# Patient Record
Sex: FEMALE | Race: WHITE
[De-identification: names, ages, dates, MRNs, and addresses within clinical notes are randomized per-mention and may not be internally consistent; named-entity substitution may affect disease eponyms.]

---

## 2019-02-19 ENCOUNTER — HOSPITAL ENCOUNTER (EMERGENCY)
Dept: HOSPITAL 74 - JER | Age: 79
LOS: 1 days | Discharge: HOME | End: 2019-02-20
Payer: COMMERCIAL

## 2019-02-19 VITALS — TEMPERATURE: 97.8 F

## 2019-02-19 VITALS — BODY MASS INDEX: 25.2 KG/M2

## 2019-02-19 DIAGNOSIS — J32.9: ICD-10-CM

## 2019-02-19 DIAGNOSIS — I10: Primary | ICD-10-CM

## 2019-02-19 NOTE — PDOC
History of Present Illness





- General


Chief Complaint: Headache


Stated Complaint: BLOOD PRESSURE PROBLEM


Time Seen by Provider: 02/19/19 21:52





- History of Present Illness


Initial Comments: 





02/20/19 02:00


79-year-old female complaining of frontal headache times one day and noted to 

have blood pressure high at home. Denies chest pain, nausea, vomiting, 

dizziness. Patient is currently on antihypertensive medications for 

hyperlipidemia.





Past History





- Past Medical History


Allergies/Adverse Reactions: 


 Allergies











Allergy/AdvReac Type Severity Reaction Status Date / Time


 


lactose Allergy   Verified 02/19/19 21:58


 


Penicillins Allergy   Verified 02/19/19 21:58











Home Medications: 


Ambulatory Orders





Metoprolol Succinate [Toprol XL -] 25 mg PO DAILY 02/21/14 


Simvastatin [Zocor -] 20 mg PO HS #0 tablet 02/24/14 








COPD: No


HTN: Yes


Hypercholesterolemia: Yes





- Surgical History


Appendectomy: Yes





- Suicide/Smoking/Psychosocial Hx


Smoking History: Never smoked


Have you smoked in the past 12 months: No


Information on smoking cessation initiated: No


Hx Alcohol Use: No


Drug/Substance Use Hx: No


Substance Use Type: None


Hx Substance Use Treatment: No





**Review of Systems





- Review of Systems


Able to Perform ROS?: Yes


Is the patient limited English proficient: No


Constitutional: No: Symptoms Reported, See HPI, Chills, Diaphoresis, Fever, 

Loss of Appetite, Malaise, Night Sweats, Weakness, Weight Stable, Unintentional 

Wgt. Loss, Unexplained wgt Loss, Other


Neurological: Yes: Headache





*Physical Exam





- Vital Signs


 Last Vital Signs











Temp Pulse Resp BP Pulse Ox


 


 97.8 F   70   18   183/80 H  97 


 


 02/19/19 21:53  02/19/19 21:53  02/19/19 21:53  02/19/19 21:53  02/19/19 21:53














- Physical Exam


General Appearance: Yes: Appropriately Dressed


HEENT: positive: Sinus Tenderness (frontal)


Respiratory/Chest: positive: Lungs Clear, Normal Breath Sounds


Cardiovascular: positive: Regular Rhythm, Regular Rate


Gastrointestinal/Abdominal: positive: Normal Bowel Sounds, Tender, Soft


Extremity: positive: Normal Capillary Refill, Normal Inspection, Normal Range 

of Motion


Integumentary: positive: Normal Color, Dry, Warm


Neurologic: positive: Fully Oriented, Alert, Normal Mood/Affect





Moderate Sedation





- Procedure Monitoring


Vital Signs: 


Procedure Monitoring Vital Signs











Temperature  97.8 F   02/19/19 21:53


 


Pulse Rate  70   02/19/19 21:53


 


Respiratory Rate  18   02/19/19 21:53


 


Blood Pressure  183/80 H  02/19/19 21:53


 


O2 Sat by Pulse Oximetry (%)  97   02/19/19 21:53











ED Treatment Course





- LABORATORY


CBC & Chemistry Diagram: 


 02/20/19 00:05





 02/20/19 00:05





Progress Note





- Progress Note


Progress Note: 





Headache; hypertension





p: CT head and CT sinus no acute finding noted to have chronic maxillary sinus 

minimal congestion





CT head s no acute finding





Labs within normal





*DC/Admit/Observation/Transfer


Diagnosis at time of Disposition: 


 Sinus headache





Hypertension


Qualifiers:


 Hypertension type: unspecified Qualified Code(s): I10 - Essential (primary) 

hypertension








- Discharge Dispostion


Disposition: HOME





- Referrals


Referrals: 


Mario Álvarez MD [Primary Care Provider] - Call tomorrow





- Patient Instructions


Printed Discharge Instructions:  DI for Migraine


Additional Instructions: 


You may take Tylenol every 4-6 hours as needed for headache.








Please follow-up with Dr. Álvarez to manage your blood pressure. Keep daily 

journal of your blood pressure.





Return immediately to the emergency room if symptoms worsen.








- Post Discharge Activity

## 2019-02-19 NOTE — PDOC
Rapid Medical Evaluation


Chief Complaint: Headache


Time Seen by Provider: 02/19/19 21:52


Medical Evaluation: 


 Allergies











Allergy/AdvReac Type Severity Reaction Status Date / Time


 


lactose Allergy   Verified 02/21/14 12:44


 


Penicillins Allergy   Verified 02/21/14 12:44











02/19/19 21:53


I have performed a brief in person evaluation of this patient.





CC: MCDONOUGH





HPI: Pt is a 78 YO female who complains of HA and HTN.  Pt's daughter took her 

BP at home it was 206 systolic.  Pt take meds at home.





PE: 


Skin: Clear


Lungs: Clear


Heart:RRR


MS: Moves all extremities without difficulty


Neuro: Alert and oriented


Psych: Appropriate affect








Pt will proceed to the main ED for further evaluation.





**Discharge Disposition





- Diagnosis


Hypertension


Qualifiers:


 Hypertension type: unspecified Qualified Code(s): I10 - Essential (primary) 

hypertension








- Referrals


Referrals: 


Mario Álvarez MD [Primary Care Provider] - 





- Patient Instructions





- Post Discharge Activity

## 2019-02-19 NOTE — PDOC
*Physical Exam





- Vital Signs


 Last Vital Signs











Temp Pulse Resp BP Pulse Ox


 


 97.8 F   70   18   183/80 H  97 


 


 02/19/19 21:53  02/19/19 21:53  02/19/19 21:53  02/19/19 21:53  02/19/19 21:53














ED Treatment Course





- LABORATORY


CBC & Chemistry Diagram: 


 02/20/19 00:05





 02/20/19 00:05





Medical Decision Making





- Medical Decision Making





02/19/19 22:51


Patient seen by the advanced practice provider under my direct supervision. 

Ancillary testing reviewed as necessary.


I agree with plan as outlined by the advanced practice provider.





*DC/Admit/Observation/Transfer


Diagnosis at time of Disposition: 


 Sinus headache





Hypertension


Qualifiers:


 Hypertension type: unspecified Qualified Code(s): I10 - Essential (primary) 

hypertension








- Discharge Dispostion


Disposition: HOME





- Referrals


Referrals: 


Mario Álvarez MD [Primary Care Provider] - Call tomorrow





- Patient Instructions


Printed Discharge Instructions:  DI for Migraine


Additional Instructions: 


You may take Tylenol every 4-6 hours as needed for headache.








Please follow-up with Dr. Álvarez to manage your blood pressure. Keep daily 

journal of your blood pressure.





Return immediately to the emergency room if symptoms worsen.








- Post Discharge Activity

## 2019-02-20 VITALS — DIASTOLIC BLOOD PRESSURE: 76 MMHG | SYSTOLIC BLOOD PRESSURE: 177 MMHG | HEART RATE: 88 BPM

## 2019-02-20 LAB
ALBUMIN SERPL-MCNC: 3.7 G/DL (ref 3.4–5)
ALP SERPL-CCNC: 80 U/L (ref 45–117)
ALT SERPL-CCNC: 35 U/L (ref 13–61)
ANION GAP SERPL CALC-SCNC: 5 MMOL/L (ref 8–16)
APPEARANCE UR: CLEAR
AST SERPL-CCNC: 27 U/L (ref 15–37)
BACTERIA #/AREA URNS HPF: (no result) /HPF
BASOPHILS # BLD: 0.6 % (ref 0–2)
BILIRUB SERPL-MCNC: 0.2 MG/DL (ref 0.2–1)
BILIRUB UR STRIP.AUTO-MCNC: NEGATIVE MG/DL
BUN SERPL-MCNC: 18 MG/DL (ref 7–18)
CALCIUM SERPL-MCNC: 8.5 MG/DL (ref 8.5–10.1)
CHLORIDE SERPL-SCNC: 102 MMOL/L (ref 98–107)
CO2 SERPL-SCNC: 30 MMOL/L (ref 21–32)
COLOR UR: COLORLESS
CREAT SERPL-MCNC: 0.8 MG/DL (ref 0.55–1.3)
DEPRECATED RDW RBC AUTO: 12.8 % (ref 11.6–15.6)
EOSINOPHIL # BLD: 1.7 % (ref 0–4.5)
EPITH CASTS URNS QL MICRO: (no result) /HPF
GLUCOSE SERPL-MCNC: 107 MG/DL (ref 74–106)
HCT VFR BLD CALC: 43.1 % (ref 32.4–45.2)
HGB BLD-MCNC: 14.8 GM/DL (ref 10.7–15.3)
INR BLD: 0.98 (ref 0.83–1.09)
KETONES UR QL STRIP: NEGATIVE
LEUKOCYTE ESTERASE UR QL STRIP.AUTO: NEGATIVE
LYMPHOCYTES # BLD: 27 % (ref 8–40)
MAGNESIUM SERPL-MCNC: 2.3 MG/DL (ref 1.8–2.4)
MCH RBC QN AUTO: 31.8 PG (ref 25.7–33.7)
MCHC RBC AUTO-ENTMCNC: 34.5 G/DL (ref 32–36)
MCV RBC: 92.1 FL (ref 80–96)
MONOCYTES # BLD AUTO: 7.9 % (ref 3.8–10.2)
NEUTROPHILS # BLD: 62.8 % (ref 42.8–82.8)
NITRITE UR QL STRIP: NEGATIVE
PH UR: 6 [PH] (ref 5–8)
PLATELET # BLD AUTO: 233 K/MM3 (ref 134–434)
PMV BLD: 8.8 FL (ref 7.5–11.1)
POTASSIUM SERPLBLD-SCNC: 4.2 MMOL/L (ref 3.5–5.1)
PROT SERPL-MCNC: 7.5 G/DL (ref 6.4–8.2)
PROT UR QL STRIP: NEGATIVE
PROT UR QL STRIP: NEGATIVE
PT PNL PPP: 11.6 SEC (ref 9.7–13)
RBC # BLD AUTO: 4.67 M/MM3 (ref 3.6–5.2)
SODIUM SERPL-SCNC: 137 MMOL/L (ref 136–145)
SP GR UR: 1 (ref 1.01–1.03)
UROBILINOGEN UR STRIP-MCNC: NEGATIVE MG/DL (ref 0.2–1)
WBC # BLD AUTO: 8.8 K/MM3 (ref 4–10)

## 2019-02-21 NOTE — EKG
Test Reason : 

Blood Pressure : ***/*** mmHG

Vent. Rate : 067 BPM     Atrial Rate : 067 BPM

   P-R Int : 164 ms          QRS Dur : 090 ms

    QT Int : 432 ms       P-R-T Axes : 046 015 027 degrees

   QTc Int : 456 ms

 

*** POOR DATA QUALITY, INTERPRETATION MAY BE ADVERSELY AFFECTED

NORMAL SINUS RHYTHM

NORMAL ECG

WHEN COMPARED WITH ECG OF 21-FEB-2014 14:03,

NO SIGNIFICANT CHANGE WAS FOUND

Confirmed by XOCHITL DUARTE, DIRK ([id number]) on 2/21/2019 2:37:11 AM

 

Referred By:             Confirmed By:DIRK LEUNG MD

## 2022-04-09 ENCOUNTER — HOSPITAL ENCOUNTER (EMERGENCY)
Dept: HOSPITAL 74 - FER | Age: 82
Discharge: HOME | End: 2022-04-09
Payer: COMMERCIAL

## 2022-04-09 VITALS — HEART RATE: 66 BPM | SYSTOLIC BLOOD PRESSURE: 141 MMHG | TEMPERATURE: 99.4 F | DIASTOLIC BLOOD PRESSURE: 63 MMHG

## 2022-04-09 VITALS — BODY MASS INDEX: 30.9 KG/M2

## 2022-04-09 DIAGNOSIS — U07.1: Primary | ICD-10-CM

## 2022-04-09 PROCEDURE — U0005 INFEC AGEN DETEC AMPLI PROBE: HCPCS

## 2022-04-09 PROCEDURE — U0003 INFECTIOUS AGENT DETECTION BY NUCLEIC ACID (DNA OR RNA); SEVERE ACUTE RESPIRATORY SYNDROME CORONAVIRUS 2 (SARS-COV-2) (CORONAVIRUS DISEASE [COVID-19]), AMPLIFIED PROBE TECHNIQUE, MAKING USE OF HIGH THROUGHPUT TECHNOLOGIES AS DESCRIBED BY CMS-2020-01-R: HCPCS

## 2022-04-12 ENCOUNTER — HOSPITAL ENCOUNTER (EMERGENCY)
Dept: HOSPITAL 74 - JER | Age: 82
Discharge: HOME | End: 2022-04-12
Payer: COMMERCIAL

## 2022-04-12 VITALS — DIASTOLIC BLOOD PRESSURE: 63 MMHG | SYSTOLIC BLOOD PRESSURE: 165 MMHG | TEMPERATURE: 98.1 F | HEART RATE: 61 BPM

## 2022-04-12 VITALS — BODY MASS INDEX: 29.9 KG/M2

## 2022-04-12 DIAGNOSIS — U07.1: Primary | ICD-10-CM

## 2022-04-12 PROCEDURE — 3E033GC INTRODUCTION OF OTHER THERAPEUTIC SUBSTANCE INTO PERIPHERAL VEIN, PERCUTANEOUS APPROACH: ICD-10-PCS

## 2022-11-09 ENCOUNTER — OFFICE (OUTPATIENT)
Dept: URBAN - METROPOLITAN AREA CLINIC 121 | Facility: CLINIC | Age: 82
Setting detail: OPHTHALMOLOGY
End: 2022-11-09
Payer: COMMERCIAL

## 2022-11-09 DIAGNOSIS — H25.13: ICD-10-CM

## 2022-11-09 DIAGNOSIS — H40.013: ICD-10-CM

## 2022-11-09 DIAGNOSIS — H35.3131: ICD-10-CM

## 2022-11-09 PROBLEM — H16.223 DRY EYE SYNDROME K SICCA; BOTH EYES: Status: ACTIVE | Noted: 2022-11-09

## 2022-11-09 PROBLEM — H35.033 HYPERTENSIVE RETINOPATHY; BOTH EYES: Status: ACTIVE | Noted: 2022-11-09

## 2022-11-09 PROBLEM — E11.9 DIABETES TYPE 2 NO RETINOPATHY ; BOTH EYES: Status: ACTIVE | Noted: 2022-11-09

## 2022-11-09 PROCEDURE — 76514 ECHO EXAM OF EYE THICKNESS: CPT | Performed by: OPHTHALMOLOGY

## 2022-11-09 PROCEDURE — 92004 COMPRE OPH EXAM NEW PT 1/>: CPT | Performed by: OPHTHALMOLOGY

## 2022-11-09 PROCEDURE — 92250 FUNDUS PHOTOGRAPHY W/I&R: CPT | Performed by: OPHTHALMOLOGY

## 2022-11-09 ASSESSMENT — SUPERFICIAL PUNCTATE KERATITIS (SPK)
OD_SPK: 2+
OS_SPK: 2+

## 2022-11-09 ASSESSMENT — CONFRONTATIONAL VISUAL FIELD TEST (CVF)
OS_FINDINGS: FULL
OD_FINDINGS: FULL

## 2022-11-09 ASSESSMENT — PACHYMETRY
OD_CT_UM: 537
OS_CT_UM: 552
OD_CT_CORRECTION: 1
OS_CT_CORRECTION: -1

## 2022-11-09 ASSESSMENT — TONOMETRY
OD_IOP_MMHG: 14
OS_IOP_MMHG: 14

## 2022-11-11 ENCOUNTER — RX ONLY (RX ONLY)
Age: 82
End: 2022-11-11

## 2022-11-11 ASSESSMENT — REFRACTION_CURRENTRX
OD_AXIS: 180
OS_OVR_VA: 20/
OD_OVR_VA: 20/
OD_CYLINDER: 0.00
OS_AXIS: 012
OD_SPHERE: +2.50
OS_CYLINDER: +0.25
OS_SPHERE: +2.25

## 2022-11-11 ASSESSMENT — KERATOMETRY
OD_K2POWER_DIOPTERS: 46.00
OS_AXISANGLE_DEGREES: 072
OS_K1POWER_DIOPTERS: 45.00
OS_K2POWER_DIOPTERS: 46.25
OD_K1POWER_DIOPTERS: 45.00
OD_AXISANGLE_DEGREES: 083

## 2022-11-11 ASSESSMENT — VISUAL ACUITY
OS_BCVA: 20/80
OD_BCVA: 20/50

## 2022-11-11 ASSESSMENT — SPHEQUIV_DERIVED
OS_SPHEQUIV: 0.25
OD_SPHEQUIV: -0.625

## 2022-11-11 ASSESSMENT — REFRACTION_AUTOREFRACTION
OD_CYLINDER: +1.25
OD_AXIS: 104
OS_SPHERE: -0.25
OS_AXIS: 002
OS_CYLINDER: +1.00
OD_SPHERE: -1.25

## 2022-11-11 ASSESSMENT — AXIALLENGTH_DERIVED
OS_AL: 22.7455
OD_AL: 23.1108

## 2023-01-13 ENCOUNTER — OFFICE (OUTPATIENT)
Dept: URBAN - METROPOLITAN AREA CLINIC 121 | Facility: CLINIC | Age: 83
Setting detail: OPHTHALMOLOGY
End: 2023-01-13
Payer: COMMERCIAL

## 2023-01-13 DIAGNOSIS — H35.3131: ICD-10-CM

## 2023-01-13 DIAGNOSIS — H25.12: ICD-10-CM

## 2023-01-13 DIAGNOSIS — H25.13: ICD-10-CM

## 2023-01-13 PROBLEM — H57.03: Status: ACTIVE | Noted: 2023-01-13

## 2023-01-13 PROCEDURE — 99213 OFFICE O/P EST LOW 20 MIN: CPT | Performed by: OPHTHALMOLOGY

## 2023-01-13 PROCEDURE — 92136 OPHTHALMIC BIOMETRY: CPT | Performed by: OPHTHALMOLOGY

## 2023-01-13 PROCEDURE — 92134 CPTRZ OPH DX IMG PST SGM RTA: CPT | Performed by: OPHTHALMOLOGY

## 2023-01-13 ASSESSMENT — AXIALLENGTH_DERIVED
OD_AL: 22.8821
OS_AL: 22.0411
OS_AL: 23.0642
OD_AL: 21.9588

## 2023-01-13 ASSESSMENT — KERATOMETRY
OS_K1POWER_DIOPTERS: 45.25
OD_K1POWER_DIOPTERS: 45.25
OS_K1K2_AVERAGE: 45.5
OS_CYLPOWER_DEGREES: 0.5
OS_K1POWER_DIOPTERS: 45.25
OD_AXISANGLE2_DEGREES: 076
OS_K2POWER_DIOPTERS: 45.75
OS_CYLAXISANGLE_DEGREES: 051
OS_AXISANGLE2_DEGREES: 051
OD_K1K2_AVERAGE: 45.625
OD_AXISANGLE_DEGREES: 166
OD_AXISANGLE_DEGREES: 076
OD_K2POWER_DIOPTERS: 46.00
OD_CYLPOWER_DEGREES: 0.75
OS_K2POWER_DIOPTERS: 45.75
OS_AXISANGLE_DEGREES: 141
OD_K2POWER_DIOPTERS: 46.00
OS_AXISANGLE_DEGREES: 051
OD_K1POWER_DIOPTERS: 45.25
OD_CYLAXISANGLE_DEGREES: 076

## 2023-01-13 ASSESSMENT — REFRACTION_CURRENTRX
OS_OVR_VA: 20/
OD_CYLINDER: 0.00
OS_SPHERE: +2.25
OS_AXIS: 012
OD_OVR_VA: 20/
OD_SPHERE: +2.50
OD_AXIS: 180
OS_CYLINDER: +0.25

## 2023-01-13 ASSESSMENT — SPHEQUIV_DERIVED
OS_SPHEQUIV: 2.375
OD_SPHEQUIV: 2.5
OD_SPHEQUIV: -0.125
OS_SPHEQUIV: -0.5

## 2023-01-13 ASSESSMENT — REFRACTION_AUTOREFRACTION
OS_SPHERE: -1.00
OS_CYLINDER: +1.00
OD_SPHERE: -0.50
OS_AXIS: 175
OD_AXIS: 004
OD_CYLINDER: +0.75

## 2023-01-13 ASSESSMENT — PACHYMETRY
OS_CT_UM: 552
OD_CT_CORRECTION: 1
OS_CT_CORRECTION: -1
OD_CT_UM: 537

## 2023-01-13 ASSESSMENT — TONOMETRY
OS_IOP_MMHG: 15
OD_IOP_MMHG: 15

## 2023-01-13 ASSESSMENT — CONFRONTATIONAL VISUAL FIELD TEST (CVF)
OS_FINDINGS: FULL
OD_FINDINGS: FULL

## 2023-01-13 ASSESSMENT — SUPERFICIAL PUNCTATE KERATITIS (SPK)
OD_SPK: 2+
OS_SPK: 2+

## 2023-01-13 ASSESSMENT — REFRACTION_MANIFEST
OD_AXIS: 180
OS_AXIS: 012
OS_CYLINDER: +0.25
OD_CYLINDER: 0.00
OD_SPHERE: +2.50
OS_SPHERE: +2.25

## 2023-01-13 ASSESSMENT — VISUAL ACUITY
OS_BCVA: 20/80
OD_BCVA: 20/80

## 2023-04-03 PROBLEM — Z00.00 ENCOUNTER FOR PREVENTIVE HEALTH EXAMINATION: Status: ACTIVE | Noted: 2023-04-03

## 2023-04-04 ENCOUNTER — APPOINTMENT (OUTPATIENT)
Dept: PEDIATRIC ORTHOPEDIC SURGERY | Facility: CLINIC | Age: 83
End: 2023-04-04
Payer: MEDICARE

## 2023-04-04 VITALS — WEIGHT: 178 LBS | BODY MASS INDEX: 30.39 KG/M2 | HEIGHT: 64 IN

## 2023-04-04 VITALS — SYSTOLIC BLOOD PRESSURE: 139 MMHG | TEMPERATURE: 97 F | DIASTOLIC BLOOD PRESSURE: 74 MMHG

## 2023-04-04 DIAGNOSIS — M19.011 PRIMARY OSTEOARTHRITIS, RIGHT SHOULDER: ICD-10-CM

## 2023-04-04 PROCEDURE — 99202 OFFICE O/P NEW SF 15 MIN: CPT

## 2023-04-04 PROCEDURE — 73030 X-RAY EXAM OF SHOULDER: CPT | Mod: RT

## 2023-04-04 NOTE — ASSESSMENT
[FreeTextEntry1] : DJD right shoulder\par \par This patient will be treated with meloxicam as well as physical therapy.  She may return for cortisone injection.

## 2023-04-04 NOTE — PHYSICAL EXAM
[de-identified] : On physical examination there is a full range of motion of the cervical spine.  Examination of the left shoulder reveals marked tenderness in the anterior aspect of the shoulder.  She has limitation of all planes of motion especially abduction forward flexion and internal rotation.  Motor or sensory and deep tendon reflex examination of the right upper extremity is within normal limits.  This patient is right-handed. [de-identified] : X-ray evaluation of the right shoulder on 4/4/2023 (AP and lateral views) reveals degenerative changes in the acromioclavicular joint and to a lesser degree in the glenohumeral joint.

## 2023-04-04 NOTE — HISTORY OF PRESENT ILLNESS
[de-identified] : This 83-year-old female is here for evaluation to month history of right shoulder pain which has recently become worse.  She states that she has limited motion of the shoulder before she experiences pain.  There has been no history of injury.  There is no neurological symptomatology.

## 2023-05-19 ENCOUNTER — APPOINTMENT (OUTPATIENT)
Dept: VASCULAR SURGERY | Facility: CLINIC | Age: 83
End: 2023-05-19
Payer: MEDICARE

## 2023-05-19 VITALS
OXYGEN SATURATION: 96 % | HEART RATE: 64 BPM | WEIGHT: 188 LBS | HEIGHT: 64 IN | DIASTOLIC BLOOD PRESSURE: 70 MMHG | BODY MASS INDEX: 32.1 KG/M2 | SYSTOLIC BLOOD PRESSURE: 130 MMHG | RESPIRATION RATE: 16 BRPM

## 2023-05-19 DIAGNOSIS — Z78.9 OTHER SPECIFIED HEALTH STATUS: ICD-10-CM

## 2023-05-19 DIAGNOSIS — J30.2 OTHER SEASONAL ALLERGIC RHINITIS: ICD-10-CM

## 2023-05-19 PROCEDURE — 99204 OFFICE O/P NEW MOD 45 MIN: CPT

## 2023-05-20 PROBLEM — J30.2 SEASONAL ALLERGIES: Status: ACTIVE | Noted: 2023-05-20

## 2023-05-20 PROBLEM — Z78.9 NON-SMOKER: Status: ACTIVE | Noted: 2023-05-20

## 2023-05-20 RX ORDER — ACETAMINOPHEN 500 MG/1
500 TABLET, COATED ORAL
Refills: 0 | Status: ACTIVE | COMMUNITY

## 2023-05-20 RX ORDER — FLUTICASONE PROPIONATE 50 UG/1
50 SPRAY, METERED NASAL
Refills: 0 | Status: ACTIVE | COMMUNITY

## 2023-05-20 RX ORDER — OLMESARTAN MEDOXOMIL 40 MG/1
40 TABLET, FILM COATED ORAL DAILY
Refills: 0 | Status: ACTIVE | COMMUNITY

## 2023-05-20 RX ORDER — METOPROLOL SUCCINATE 50 MG/1
50 TABLET, EXTENDED RELEASE ORAL DAILY
Refills: 0 | Status: ACTIVE | COMMUNITY

## 2023-05-20 RX ORDER — DONEPEZIL HYDROCHLORIDE 10 MG/1
10 TABLET ORAL AT BEDTIME
Refills: 0 | Status: ACTIVE | COMMUNITY

## 2023-05-20 RX ORDER — CALCIUM CITRATE/VITAMIN D3 250MG-5MCG
TABLET ORAL
Refills: 0 | Status: ACTIVE | COMMUNITY

## 2023-05-20 RX ORDER — NIACIN 500 MG
1000 TABLET, EXTENDED RELEASE ORAL DAILY
Refills: 0 | Status: ACTIVE | COMMUNITY

## 2023-05-20 RX ORDER — LACTOBACILLUS RHAMNOSUS GG 10B CELL
CAPSULE ORAL DAILY
Refills: 0 | Status: ACTIVE | COMMUNITY

## 2023-05-20 NOTE — HISTORY OF PRESENT ILLNESS
[FreeTextEntry1] : 84 yo female presents to the office with a chief complaint of bluish discoloration of the feet. She also reports mild edema of bilateral lower extremities. She has a history of a venous procedure on the left. She denies pain in her legs with ambulation. She does not currently wear compression stockings. She denies a history of DVT or SVT.

## 2023-05-20 NOTE — ASSESSMENT
[FreeTextEntry1] : 83 year female with bilateral lower extremity edema and bluish discoloration of her feet. She has a history of a venous procedure on the left.  I am uncertain if she has venous insufficiency. I recommended that she undergo a venous duplex. She was instructed to begin to wear compression stockings on a daily basis.  She will elevate her legs as much as possible. She will follow up when the results of the duplex are available.  Further treatment will depend on the results of the duplex and the response to conservative therapy

## 2023-05-20 NOTE — REVIEW OF SYSTEMS
[Fever] : no fever [Chills] : no chills [Leg Claudication] : no intermittent leg claudication [Lower Ext Edema] : lower extremity edema [Limb Pain] : no limb pain [Limb Swelling] : no limb swelling

## 2023-05-20 NOTE — PHYSICAL EXAM
[JVD] : no jugular venous distention  [Normal Breath Sounds] : Normal breath sounds [Normal Rate and Rhythm] : normal rate and rhythm [2+] : left 2+ [Ankle Swelling (On Exam)] : present [Ankle Swelling Bilaterally] : bilaterally  [Ankle Swelling On The Right] : mild [Varicose Veins Of Lower Extremities] : bilaterally [Ankle Swelling On The Left] : moderate [Alert] : alert [] : bilaterally [Oriented to Person] : oriented to person [Oriented to Place] : oriented to place [Oriented to Time] : oriented to time [de-identified] : Awake and Alert [de-identified] : varicose veins bilateral calves > 3mm, spider veins bl [de-identified] : Appropriate

## 2023-08-28 ENCOUNTER — HOSPITAL ENCOUNTER (EMERGENCY)
Dept: HOSPITAL 74 - FER | Age: 83
Discharge: HOME | End: 2023-08-28
Payer: COMMERCIAL

## 2023-08-28 VITALS — BODY MASS INDEX: 30.9 KG/M2

## 2023-08-28 VITALS — HEART RATE: 82 BPM | TEMPERATURE: 98.4 F | SYSTOLIC BLOOD PRESSURE: 147 MMHG | DIASTOLIC BLOOD PRESSURE: 89 MMHG

## 2023-08-28 VITALS — RESPIRATION RATE: 18 BRPM

## 2023-08-28 DIAGNOSIS — R05.9: Primary | ICD-10-CM

## 2023-08-28 DIAGNOSIS — Z20.822: ICD-10-CM

## 2023-08-28 LAB
ALBUMIN SERPL-MCNC: 4.1 G/DL (ref 3.4–5)
ALP SERPL-CCNC: 64 U/L (ref 45–117)
ALT SERPL-CCNC: 13.1 U/L (ref 7–52)
ANION GAP SERPL CALC-SCNC: 12 MMOL/L (ref 8–16)
AST SERPL-CCNC: 17.8 U/L (ref 15–37)
BASE EXCESS BLDV CALC-SCNC: -1.8 MMOL/L (ref -2–2)
BUN SERPL-MCNC: 20 MG/DL (ref 7–18)
CALCIUM SERPL-MCNC: 8.9 MG/DL (ref 8.5–10.1)
CHLORIDE SERPL-SCNC: 98 MMOL/L (ref 98–107)
CO2 SERPL-SCNC: 24 MMOL/L (ref 21–32)
CREAT SERPL-MCNC: 0.9 MG/DL (ref 0.6–1.3)
DEPRECATED RDW RBC AUTO: 13.5 % (ref 11.6–15.6)
GLUCOSE SERPL-MCNC: 104 MG/DL (ref 74–106)
HCT VFR BLD CALC: 40.4 % (ref 32.4–45.2)
HCT VFR BLDV CALC: 43 % (ref 32.4–45.2)
HGB BLD-MCNC: 13.9 G/DL (ref 10.7–15.3)
MCH RBC QN AUTO: 31.8 PG (ref 25.7–33.7)
MCHC RBC AUTO-ENTMCNC: 34.3 G/DL (ref 32–36)
MCV RBC: 92.9 FL (ref 80–96)
PCO2 BLDV: 45.2 MMHG (ref 38–52)
PH BLDV: 7.34 [PH] (ref 7.31–7.41)
PLATELET # BLD AUTO: 253.8 10^3/UL (ref 134–434)
PLATELET BLD QL SMEAR: ADEQUATE
PMV BLD: 8 FL (ref 7.5–11.1)
POTASSIUM SERPLBLD-SCNC: 4.2 MMOL/L (ref 3.5–5.1)
PROT SERPL-MCNC: 6.7 G/DL (ref 6.4–8.2)
RBC # BLD AUTO: 4.35 10^6/UL (ref 3.6–5.2)
SAO2 % BLDV: 38.6 % (ref 70–80)
SODIUM SERPL-SCNC: 134 MMOL/L (ref 136–145)
WBC # BLD AUTO: 13.3 10^3/UL (ref 4–10.8)

## 2023-08-28 PROCEDURE — 3E0F7GC INTRODUCTION OF OTHER THERAPEUTIC SUBSTANCE INTO RESPIRATORY TRACT, VIA NATURAL OR ARTIFICIAL OPENING: ICD-10-PCS

## 2023-08-29 LAB — BILIRUB SERPL-MCNC: 0.4 MG/DL (ref 0.2–1)

## 2023-09-01 ENCOUNTER — APPOINTMENT (OUTPATIENT)
Dept: VASCULAR SURGERY | Facility: CLINIC | Age: 83
End: 2023-09-01

## 2023-10-27 ENCOUNTER — APPOINTMENT (OUTPATIENT)
Dept: HEART AND VASCULAR | Facility: CLINIC | Age: 83
End: 2023-10-27
Payer: MEDICARE

## 2023-10-27 PROCEDURE — 93970 EXTREMITY STUDY: CPT

## 2023-11-17 ENCOUNTER — TRANSCRIPTION ENCOUNTER (OUTPATIENT)
Age: 83
End: 2023-11-17

## 2023-11-17 ENCOUNTER — APPOINTMENT (OUTPATIENT)
Dept: VASCULAR SURGERY | Facility: CLINIC | Age: 83
End: 2023-11-17
Payer: MEDICARE

## 2023-11-17 VITALS
SYSTOLIC BLOOD PRESSURE: 119 MMHG | RESPIRATION RATE: 16 BRPM | HEART RATE: 60 BPM | BODY MASS INDEX: 32.1 KG/M2 | WEIGHT: 188 LBS | OXYGEN SATURATION: 97 % | DIASTOLIC BLOOD PRESSURE: 58 MMHG | HEIGHT: 64 IN

## 2023-11-17 PROCEDURE — 99213 OFFICE O/P EST LOW 20 MIN: CPT

## 2023-11-17 RX ORDER — UBIDECARENONE 400 MG
400 CAPSULE ORAL DAILY
Refills: 0 | Status: ACTIVE | COMMUNITY

## 2023-11-17 RX ORDER — ASCORBIC ACID 125 MG
500 TABLET,CHEWABLE ORAL DAILY
Refills: 0 | Status: ACTIVE | COMMUNITY

## 2023-11-17 RX ORDER — LIDOCAINE HCL 4 %
CREAM (GRAM) TOPICAL
Refills: 0 | Status: ACTIVE | COMMUNITY

## 2023-11-17 RX ORDER — LORATADINE 5 MG/5 ML
SOLUTION, ORAL ORAL DAILY
Refills: 0 | Status: ACTIVE | COMMUNITY

## 2023-11-17 RX ORDER — RIBOFLAVIN (VITAMIN B2) 400 MG
400 TABLET ORAL DAILY
Refills: 0 | Status: ACTIVE | COMMUNITY

## 2023-11-17 RX ORDER — AMLODIPINE BESYLATE 2.5 MG/1
2.5 TABLET ORAL
Refills: 0 | Status: ACTIVE | COMMUNITY

## 2023-11-17 RX ORDER — CETIRIZINE HYDROCHLORIDE 10 MG/1
10 TABLET, COATED ORAL
Refills: 0 | Status: DISCONTINUED | COMMUNITY
End: 2023-11-17

## 2024-01-10 ENCOUNTER — APPOINTMENT (OUTPATIENT)
Dept: PEDIATRIC ORTHOPEDIC SURGERY | Facility: CLINIC | Age: 84
End: 2024-01-10
Payer: MEDICARE

## 2024-01-10 VITALS
WEIGHT: 180 LBS | SYSTOLIC BLOOD PRESSURE: 102 MMHG | HEIGHT: 64 IN | TEMPERATURE: 96.9 F | DIASTOLIC BLOOD PRESSURE: 70 MMHG | BODY MASS INDEX: 30.73 KG/M2

## 2024-01-10 PROCEDURE — 73030 X-RAY EXAM OF SHOULDER: CPT | Mod: LT

## 2024-01-10 PROCEDURE — 20610 DRAIN/INJ JOINT/BURSA W/O US: CPT | Mod: LT

## 2024-01-10 PROCEDURE — 99213 OFFICE O/P EST LOW 20 MIN: CPT | Mod: 25

## 2024-01-10 NOTE — ASSESSMENT
[FreeTextEntry1] : Impression: Strain left shoulder.  I have injected the subacromial space uneventfully with methylprednisolone and lidocaine.  She will use Tylenol for pain and will return on a as needed basis

## 2024-01-10 NOTE — HISTORY OF PRESENT ILLNESS
[de-identified] : This 83-year-old is seen today with significant pain and restricted motion to her left shoulder.  No obvious history of trauma precipitating event this has been progressive over time over the past 6 weeks or so.  General health has been stable.

## 2024-01-10 NOTE — PROCEDURE
[FreeTextEntry1] : Under sterile technique with a Betadine prep the subacromial space of the left shoulder was injected with 40 mg of methylprednisolone and 5 cc of 1% lidocaine with a Band-Aid dressing applied at the conclusion this was tolerated without incident

## 2024-01-10 NOTE — PHYSICAL EXAM
[de-identified] : Exam today good motion to the cervical spine without spasm or tenderness the left shoulder has moderate amount of restricted forward flexion abduction in scapular plane as well as restricted rotation both in/out tenderness over the greater tuberosity and subacromial space no obvious crepitus on motion noted no instability on stress neurovascular status is intact.  X-rays ordered and taken today of the left shoulder reveal no significant arthritic changes mild degenerative changes pr

## 2024-02-05 ENCOUNTER — NON-APPOINTMENT (OUTPATIENT)
Age: 84
End: 2024-02-05

## 2024-02-06 ENCOUNTER — APPOINTMENT (OUTPATIENT)
Dept: GERIATRICS | Facility: CLINIC | Age: 84
End: 2024-02-06
Payer: MEDICARE

## 2024-02-06 VITALS
DIASTOLIC BLOOD PRESSURE: 62 MMHG | BODY MASS INDEX: 32.79 KG/M2 | TEMPERATURE: 97.6 F | OXYGEN SATURATION: 99 % | SYSTOLIC BLOOD PRESSURE: 124 MMHG | HEART RATE: 61 BPM | WEIGHT: 191 LBS

## 2024-02-06 DIAGNOSIS — I87.2 VENOUS INSUFFICIENCY (CHRONIC) (PERIPHERAL): ICD-10-CM

## 2024-02-06 DIAGNOSIS — J45.909 UNSPECIFIED ASTHMA, UNCOMPLICATED: ICD-10-CM

## 2024-02-06 DIAGNOSIS — Z86.79 PERSONAL HISTORY OF OTHER DISEASES OF THE CIRCULATORY SYSTEM: ICD-10-CM

## 2024-02-06 PROCEDURE — 99204 OFFICE O/P NEW MOD 45 MIN: CPT

## 2024-02-06 RX ORDER — METFORMIN HYDROCHLORIDE 500 MG/1
500 TABLET, FILM COATED, EXTENDED RELEASE ORAL DAILY
Refills: 0 | Status: ACTIVE | COMMUNITY
Start: 2024-02-06

## 2024-02-06 RX ORDER — MELOXICAM 7.5 MG/1
7.5 TABLET ORAL
Qty: 30 | Refills: 2 | Status: DISCONTINUED | COMMUNITY
Start: 2023-04-04 | End: 2024-02-06

## 2024-02-06 RX ORDER — ALBUTEROL SULFATE 90 UG/1
108 (90 BASE) INHALANT RESPIRATORY (INHALATION)
Refills: 0 | Status: ACTIVE | COMMUNITY
Start: 2024-02-06

## 2024-02-06 NOTE — HISTORY OF PRESENT ILLNESS
[FreeTextEntry1] : 83 year old saw Dr Mchugh - who is retiring [place]   7 years  lived in [place] prior  recent diagnosis of of Alzheimers moved in with daughter 1 1/2 years ago  cardiologist - hypertension had est normal EF, no ischemia 4/5/23  endocrine - DM  has optho has cataracts  neuro- f/up headaches and dizziness relief with mg and vitamins B2 and coenzyme Q  had cortisone injection in Jan 24 left   labs12/19/23 cmet hba1c 5.6 lipids 185  taking fish ol - to recheck [facility]  has proxy  [facility] will  had flu vaccine - 9/7/23 covid booster - 9/26/23 had RSV - 9/18/23 all in sept unsure   UA clear in OCt  feb - to see urologist bc of frequent urination   mammo - remote past [facility]  dexa - needs  never had colonsocpy  parents lived to 80s in [place] n osiblings  2 children son and daughter

## 2024-02-06 NOTE — PHYSICAL EXAM
[Alert] : alert [Well Nourished] : well nourished [Healthy Appearing] : healthy appearing [No Acute Distress] : in no acute distress [Normal Voice/Communication] : normal voice/communication [Sclera] : the sclera and conjunctiva were normal [Normal Outer Ear/Nose] : the ears and nose were normal in appearance [No Neck Mass] : no neck mass was observed [Thyroid Not Enlarged] : the thyroid was not enlarged [Supple] : the neck was supple [No Thyroid Nodules] : there were no palpable thyroid nodules [No Respiratory Distress] : no respiratory distress [No Acc Muscle Use] : no accessory muscle use [Respiration, Rhythm And Depth] : normal respiratory rhythm and effort [Auscultation Breath Sounds / Voice Sounds] : lungs were clear to auscultation bilaterally [Normal S1, S2] : normal S1 and S2 [Murmurs] : no murmurs [Heart Rate And Rhythm] : heart rate was normal and rhythm regular [No Rubs] : no pericardial rub [Edema] : edema was not present [Normal Appearance] : normal in appearance [Breast Palpation Mass] : no palpable masses [No Nipple Discharge] : no nipple discharge [Abdomen Tenderness] : non-tender [No Masses] : no abdominal mass palpated [Abdomen Soft] : soft [Cervical Lymph Nodes Enlarged Posterior Bilaterally] : posterior cervical [Supraclavicular Lymph Nodes Enlarged Bilaterally] : supraclavicular [Cervical Lymph Nodes Enlarged Anterior Bilaterally] : anterior cervical, supraclavicular [Axillary Lymph Nodes Enlarged Bilaterally] : axillary [] : no rash [Skin Lesions] : no skin lesions [No Focal Deficits] : no focal deficits [Motor Exam] : the motor exam was normal [Normal] : normal affect and normal mood [de-identified] : has straight cane [de-identified] : seb keratoses [de-identified] : normal conversation

## 2024-02-06 NOTE — ASSESSMENT
[FreeTextEntry1] : 83 year old female here to establish primary care had labs reviewed has avacne directives normal est needs optho dexa mammo

## 2024-02-16 ENCOUNTER — APPOINTMENT (OUTPATIENT)
Dept: PEDIATRIC ORTHOPEDIC SURGERY | Facility: CLINIC | Age: 84
End: 2024-02-16
Payer: MEDICARE

## 2024-02-16 VITALS — WEIGHT: 191 LBS | TEMPERATURE: 96.8 F | HEIGHT: 64 IN | BODY MASS INDEX: 32.61 KG/M2

## 2024-02-16 DIAGNOSIS — S46.012A STRAIN OF MUSCLE(S) AND TENDON(S) OF THE ROTATOR CUFF OF LEFT SHOULDER, INITIAL ENCOUNTER: ICD-10-CM

## 2024-02-16 PROCEDURE — 99213 OFFICE O/P EST LOW 20 MIN: CPT | Mod: 25

## 2024-02-16 PROCEDURE — 73080 X-RAY EXAM OF ELBOW: CPT | Mod: LT

## 2024-02-16 PROCEDURE — 20551 NJX 1 TENDON ORIGIN/INSJ: CPT | Mod: LT

## 2024-02-16 NOTE — PROCEDURE
[FreeTextEntry1] : Under sterile technique with a Betadine prep the lateral epicondyle of the left elbow was injected with a 22-gauge needle with 40 mg of methylprednisolone and 2 cc of 1% lidocaine with a Band-Aid dressing applied at the conclusion this was tolerated without incident

## 2024-02-16 NOTE — HISTORY OF PRESENT ILLNESS
[de-identified] : This 83-year-old returns she still has some discomfort to the left shoulder though significantly better.  What brings her in today is pain along the lateral aspect of the left elbow no obvious injury.  She has noticed some mild swelling in this area pain does not radiate distally no numbness or paresthesias.

## 2024-02-16 NOTE — ASSESSMENT
[FreeTextEntry1] : Impression: Lateral epicondylitis left elbow, strain left rotator cuff.  This patient was injected into the lateral epicondyle uneventfully she will use Tylenol and ice for discomfort return as needed

## 2024-02-16 NOTE — PHYSICAL EXAM
[de-identified] : On exam she has reasonable motion to the left shoulder mild restriction of full forward flexion abduction in the scapular plane.  With regards to the elbow she lacks 10 degrees of full extension rotation and flexion is intact.  She has obvious tenderness over the lateral epicondyle and posterior lateral soft spot where she does have an effusion present.  Neurovascular status is intact.  X-rays ordered and taken today of the left elbow reveal mild degenerative changes only no loose body.

## 2024-03-22 ENCOUNTER — APPOINTMENT (OUTPATIENT)
Dept: GERIATRICS | Facility: CLINIC | Age: 84
End: 2024-03-22
Payer: MEDICARE

## 2024-03-22 VITALS
TEMPERATURE: 97.8 F | OXYGEN SATURATION: 98 % | BODY MASS INDEX: 32.1 KG/M2 | DIASTOLIC BLOOD PRESSURE: 62 MMHG | SYSTOLIC BLOOD PRESSURE: 118 MMHG | WEIGHT: 187 LBS | HEART RATE: 61 BPM

## 2024-03-22 DIAGNOSIS — R32 UNSPECIFIED URINARY INCONTINENCE: ICD-10-CM

## 2024-03-22 DIAGNOSIS — R10.2 PELVIC AND PERINEAL PAIN: ICD-10-CM

## 2024-03-22 DIAGNOSIS — N28.1 CYST OF KIDNEY, ACQUIRED: ICD-10-CM

## 2024-03-22 DIAGNOSIS — R10.9 UNSPECIFIED ABDOMINAL PAIN: ICD-10-CM

## 2024-03-22 PROCEDURE — 99213 OFFICE O/P EST LOW 20 MIN: CPT

## 2024-03-22 RX ORDER — OMEGA-3/DHA/EPA/FISH OIL 1200 MG
1200 CAPSULE ORAL
Refills: 0 | Status: ACTIVE | COMMUNITY

## 2024-03-22 NOTE — REVIEW OF SYSTEMS
[Frequency] : frequency [Incontinence] : incontinence [Back Pain] : back pain [Negative] : Musculoskeletal

## 2024-03-22 NOTE — HISTORY OF PRESENT ILLNESS
[FreeTextEntry2] : ED Visit [facility] 3/15/24  BIBA for abdominal pain, nausea, chills, fatigue.    CT A/P:  Simple cysts in lever measuring 9 and 25 mm in diameter.  Probable cysts in the kidney measuring 3 mm on the right, too small for definite characterization.  No evidence of SBO, free intraperitoneal air, diverticular changes present without evidence of diverticulitis, no appendicitis. Urinary blader grossly unremarkable. Patient discharged home with dicyclomine and ondansetron.    Patient continues to experience flank pain and abd pain.  Today has diarrhea.  No fever or chills.  Labs from ED negative for UTI or elevation of LFTs.   Patient with this issue on and off for about 6 months.  She has consulted with urology Dr. Oliveira.  Unremarkable workup.  Will consult with other urologist.  Dtr would like patient to be evaluated by nephrology for renal cyst.

## 2024-03-22 NOTE — PHYSICAL EXAM
[No Acute Distress] : no acute distress [No Respiratory Distress] : no respiratory distress  [Clear to Auscultation] : lungs were clear to auscultation bilaterally [Normal Rate] : normal rate  [Soft] : abdomen soft [Normal S1, S2] : normal S1 and S2 [Normal Bowel Sounds] : normal bowel sounds [No CVA Tenderness] : no CVA  tenderness [No Spinal Tenderness] : no spinal tenderness [Alert and Oriented x3] : oriented to person, place, and time [de-identified] : +Suprapubic tenderness

## 2024-03-27 ENCOUNTER — APPOINTMENT (OUTPATIENT)
Dept: NEPHROLOGY | Facility: CLINIC | Age: 84
End: 2024-03-27
Payer: MEDICARE

## 2024-03-27 ENCOUNTER — NON-APPOINTMENT (OUTPATIENT)
Age: 84
End: 2024-03-27

## 2024-03-27 VITALS
OXYGEN SATURATION: 99 % | SYSTOLIC BLOOD PRESSURE: 116 MMHG | HEART RATE: 63 BPM | HEIGHT: 64 IN | BODY MASS INDEX: 31.92 KG/M2 | DIASTOLIC BLOOD PRESSURE: 74 MMHG | WEIGHT: 187 LBS

## 2024-03-27 DIAGNOSIS — K57.30 DIVERTICULOSIS OF LARGE INTESTINE W/OUT PERFORATION OR ABSCESS W/OUT BLEEDING: ICD-10-CM

## 2024-03-27 DIAGNOSIS — N28.1 CYST OF KIDNEY, ACQUIRED: ICD-10-CM

## 2024-03-27 PROCEDURE — 99205 OFFICE O/P NEW HI 60 MIN: CPT

## 2024-03-27 PROCEDURE — G2211 COMPLEX E/M VISIT ADD ON: CPT

## 2024-03-27 NOTE — PHYSICAL EXAM
[Sclera] : the sclera and conjunctiva were normal [Normal Appearance] : the appearance of the neck was normal [Normal] : normal bowel sounds, non-tender, no masses, soft, no no hepato-splenomegaly [Normal Color / Pigmentation] : normal skin color and pigmentation

## 2024-03-27 NOTE — HISTORY OF PRESENT ILLNESS
[FreeTextEntry1] : Pt is a 82-year-old female with HTN, HLD, DM, osteoarthritis of right shoulder, and urinary incontinence who came to the clinic for the initial evaluation of abnormal CT scan imaging.   Pt says she recently went to [facility] for abdominal pain on 3/15/24. CT scan abdomen was done which showed multiple kidney cysts too small to characterize. On review, Scr was stable at 0.8 with UA bland with no proteins, RBCs. Pt. Denies history of kidney disease or kidney stones in the past. No history of kidney disease in family. Denies recent use of NSAIDs/ motrin recently.   Pt. currently feels well. Pt. denies any chest pain, SOB, nausea, dysuria, weakness. Pt gives history of intermittent urinary symptoms. Pt is following with urologist for her urinary symptoms.

## 2024-03-27 NOTE — REVIEW OF SYSTEMS
[As Noted in HPI] : as noted in HPI [Fever] : no fever [Feeling Poorly] : not feeling poorly [Chills] : no chills [Dry Eyes] : no dryness of the eyes [Sore Throat] : no sore throat [Chest Pain] : no chest pain [Palpitations] : no palpitations [Lower Ext Edema] : no lower extremity edema [Cough] : no cough [SOB on Exertion] : no shortness of breath during exertion [Abdominal Pain] : no abdominal pain [Constipation] : no constipation [Diarrhea] : no diarrhea [Itching] : no itching [Dizziness] : no dizziness [Anxiety] : no anxiety [Fainting] : no fainting [Depression] : no depression [FreeTextEntry2] : Walks with the assistance of cane.

## 2024-03-27 NOTE — REVIEW OF SYSTEMS
[Scleral Icterus (Yellow Eyes)] : no scleral icterus [As Noted in HPI] : as noted in HPI [Skin Lesions] : no skin lesions [Proptosis] : no proptosis [Easy Bruising] : no tendency for easy bruising [Negative] : Respiratory

## 2024-03-27 NOTE — ADDENDUM
[FreeTextEntry1] : I spent a total of  ____  minutes with this patient encounter . Greater than 50% of the time spent was     devoted to counseling and coordinating care including review of records, pertinent labs     data and studies, as well as discussing diagnosis evaluation and workup, planned     therapeutic interventions and future disposition of care. This includes any additional     research needed to obtain further information in formulating the plan of care of     this patient. This includes counseling the patient about their disease and diagnosis.   \

## 2024-03-27 NOTE — ASSESSMENT
[FreeTextEntry1] : 1.Renal cysts: Pt with recent CT scan abdomen showing multiple small kidney cysts bilaterally (too small to characterize). I discussed with the patient that cysts seen on imaging are likely benign. NO further work up needed. On review, Scr was stable at 0.8 with UA bland with no proteins, RBCs. Importance of good glycemic and BP control reviewed with patient. Advised patient to avoid NSAIDs, RCAs, and other nephrotoxins. Monitor renal function.  2.HTN: Repeat BP reading in acceptable range during office visit. Continue to monitor BP on current BP meds. Low salt diet advised.  Follow up as needed.

## 2024-03-27 NOTE — ASSESSMENT
[FreeTextEntry1] :      The patient has   NOT COME    to the VISIT   This Assessment  is  in a note Titled:  Non - Appointment    which  only reflects a summary and review of records The Assessment below is tentative and preliminary.  It is based only on information gathered from chart review and will need modification once the History is taken from the patient and the patient is examined.  1. Abdominal Pain/ Flank Pain==>   recent ED visit w N, Diarrhea, chills, bloat PE--> benign Labs: WBC=8, Hgb=12, BUN=18, creatinine=0.8, LFTS/LA--> wnl CT Abd/Pelv w IVC--> small liver & R kidney cysts, Diverticulosis--desc/sigmoid Given Zofran, Dicyclomine 10mg   A/P:  DDX: No Obvious Inflammatory Process--> IBD/Diverticulitis                    Diverticular Dis--Painful w spasm                    Colonic Neoplasia                    Over Flow                     Functional Bowel dis  P:  Recommend:  * Diet: moderate  Fiber,  Low Fat & Lactose free, Low FODMAPs--was reviewed & emphasized * Daily fluid intake was reviewed : 6  --  8 cups of decaffeinated fluid daily was emphasized * Regular aerobic exercise was emphasized * Probiotics  1  daily * Miralax / Linzess/Amitiza--not presently required * Neuromodulation: not currently required  Colonoscopy to r/o partially obstructing lesions     Informed Consent: * The risks & Benefits of  Colonoscopy were discussed w patient/daughter  * This included but was not limited to perforation, bleeding, sedation /med rxns, missed lesions possibly requiring surgery, blood transfusions, antibiotics & CPR/Intubation. * Pt. understands & agrees to the procedures. The following instructions in regards to the prep and medically essential ( cardiac, pulmonary, sz, psych, endocrine)  pre-op medication administration was reviewed and emphasized with the patient .  * Pt. advised to D/C  ASA/NSAIDs  7  Days  PTP. * [ +++ ]  Dulcolax / Miralax / Mag. Citrate ,  [     ] Prepopik/ Clenpiq ,  [     ] Osmo Prep,  [    ] GoLytely,  prep. reviewed w Pt. * Hold  [  metformin          ] AM of procedure. * Hold  [           ] PM  before procedure. * Take  [           ] PM  before procedure. * Take  [ metoprolol, olmesartan          ] AM of procedure.

## 2024-03-27 NOTE — PHYSICAL EXAM
[General Appearance - Alert] : alert [General Appearance - In No Acute Distress] : in no acute distress [Sclera] : the sclera and conjunctiva were normal [General Appearance - Well Nourished] : well nourished [Outer Ear] : the ears and nose were normal in appearance [Jugular Venous Distention Increased] : there was no jugular-venous distention [Auscultation Breath Sounds / Voice Sounds] : lungs were clear to auscultation bilaterally [Heart Sounds Gallop] : no gallops [Heart Sounds] : normal S1 and S2 [Bowel Sounds] : normal bowel sounds [Edema] : there was no peripheral edema [Abdomen Soft] : soft [No CVA Tenderness] : no ~M costovertebral angle tenderness [] : no rash [Nail Clubbing] : no clubbing  or cyanosis of the fingernails [Affect] : the affect was normal [Oriented To Time, Place, And Person] : oriented to person, place, and time [No Focal Deficits] : no focal deficits [Mood] : the mood was normal [Over the Past 2 Weeks, Have You Felt Down, Depressed, or Hopeless?] : 1.) Over the past 2 weeks, have you felt down, depressed, or hopeless? No [Over the Past 2 Weeks, Have You Felt Little Interest or Pleasure Doing Things?] : 2.) Over the past 2 weeks, have you felt little interest or pleasure doing things? No

## 2024-03-27 NOTE — HISTORY OF PRESENT ILLNESS
[FreeTextEntry1] :      The patient   DID NOT COME   for the visit.  This HPI is  in a note Titled:  Non - Appointment   and reflects a summary and review of records : including previous and most recent  Labs, body imaging, consults and progress notes, operative and pathology reports, EKG reports, ED records, found in [employer], [employer],  [facility] and any additional records brought in by  the patient at the time of the visit.  It is for History taking purposes  PCP: Lily  85 yo F w Alz dementia, HTN, DM, HLD, OA, UI, Allergies Diverticulosis  3/16/24 [facility]: c/o abdominal pain, N, diarrhea, chills, fatigue, bloat PE--> benign Labs: WBC=8, Hgb=12, BUN=18, creatinine=0.8, LFTS/LA--> wnl CT Abd/Pelv w IVC--> small liver & R kidney cysts, Diverticulosis--desc/sigmoid Given Zofran, [id number]  3/23/24 [facility]--> 6 mo intermittent abd/flank pain,   PE w + suprapubic tenderness  3/28/24    Today:  Feeling well, no c/o , CP, SOB/ SIDDIQI, Cough, Wheeze, Palpitations, edema  3/28/24   Today:   * Abd pain-->no * Nausea--> no * Vomit--> no * Early satiety--> no * Belching--> no * Hiccups--> no * Regurgitation--> no * Acid Taste / Water Brash--> no * Ht burn--> no * Dysphagia--> no * Throat Clearing--> no * Hoarseness--> no * Post-Nasal Drip--> no * Congestion--> no * Globus--> no * Cough--> no * Wheeze / PC-> -no * BMs: # 4 qd * Constipation--> no * Diarrhea--> no * Bloating--> no * Strain on Defecation--> no * Incompl Evac--> no * Flatulence--> no * Gurgling--> no * Melena--> no * BPBPR-> -no * Anorexia--> no * Wt. Loss--> no

## 2024-03-28 ENCOUNTER — APPOINTMENT (OUTPATIENT)
Dept: GASTROENTEROLOGY | Facility: CLINIC | Age: 84
End: 2024-03-28

## 2024-03-29 ENCOUNTER — APPOINTMENT (OUTPATIENT)
Dept: GASTROENTEROLOGY | Facility: CLINIC | Age: 84
End: 2024-03-29
Payer: MEDICARE

## 2024-03-29 VITALS
HEART RATE: 57 BPM | OXYGEN SATURATION: 98 % | DIASTOLIC BLOOD PRESSURE: 62 MMHG | HEIGHT: 64 IN | WEIGHT: 187 LBS | BODY MASS INDEX: 31.92 KG/M2 | SYSTOLIC BLOOD PRESSURE: 116 MMHG

## 2024-03-29 DIAGNOSIS — Z12.11 ENCOUNTER FOR SCREENING FOR MALIGNANT NEOPLASM OF COLON: ICD-10-CM

## 2024-03-29 DIAGNOSIS — R10.9 UNSPECIFIED ABDOMINAL PAIN: ICD-10-CM

## 2024-03-29 DIAGNOSIS — R93.2 ABNORMAL FINDINGS ON DIAGNOSTIC IMAGING OF LIVER AND BILIARY TRACT: ICD-10-CM

## 2024-03-29 DIAGNOSIS — E11.9 TYPE 2 DIABETES MELLITUS W/OUT COMPLICATIONS: ICD-10-CM

## 2024-03-29 PROCEDURE — 99204 OFFICE O/P NEW MOD 45 MIN: CPT

## 2024-03-29 PROCEDURE — G2211 COMPLEX E/M VISIT ADD ON: CPT

## 2024-03-29 NOTE — REASON FOR VISIT
[Consultation] : a consultation visit [FreeTextEntry1] : Kindly asked by Dr. Bautista to consult and evaluate patient for  abnormal imaging, abdominal pain       A copy of this note is being sent to physician requesting consultation.

## 2024-03-29 NOTE — HISTORY OF PRESENT ILLNESS
[FreeTextEntry1] : 84f dementia, HTN, DM, HLD, OA, appy,  here for abnormal imaging, abd pain episodes. Lower abd pain lasting a few hours q 2-3months.  Last episode: Admitted 3/16/24 [facility]:  abdominal pain, N, diarrhea, chills, fatigue, bloat labs unrevealing. CT+: liver cysts 9, 25cm simple cysts, diverticulosis  Sx resolved in ER.  Feels well.  Sx started about 2-3y ago.   No constipation.  Never had a colonoscopy.  Soc:  no tobacco or significant EtOH FHx: no FHx GI malignancy or IBD  ROS: Constitutional:: no weight loss, fevers ENT: no deafness Eyes: not blind Neck: no LN Chest: no dyspnea/cough Cardiac: no chest pain Vascular: no leg swelling GI: no abdominal pain, nausea, vomiting, diarrhea, constipation, rectal bleeding, dysphagia, melena unless otherwise noted in HPI : no dysuria, dark urine Skin: no rashes, jaundice Heme: no bleeding Endocrine: no DM unless otherwise stated in HPI  Px: (VS noted below) General: NAD Eyes: anicteric Oropharynx:  clear Neck: no LN Chest: normal respiratory effort CVS: regular Abd: soft, NT, ND, +BS, no HSM Ext: no atrophy Neuro: grossly nonfocal  Labs/imaging/prior endoscopic results reviewed to the extent available and noted in HPI

## 2024-03-29 NOTE — CONSULT LETTER
[FreeTextEntry1] : Dear Dr. BONI ANN ,  I had the pleasure of evaluating your patient,  LUANA CHANCE.  Please refer to my note below.  Thank you very much for allowing me to participate in the care of this patient.  If you have any questions, please do not hesitate to contact me.  Sincerely,   Ravindra Fu MD

## 2024-03-29 NOTE — ASSESSMENT
[FreeTextEntry1] : -abd pain seems functional vs  - will plan colonoscopy to ensure no occult colon pathology. Fiber supplement trial. -liver cysts - no need for f/u - reassured. Simple cysts. - Colon cancer screening - colonoscopy due - wants to proceed regardless in light of above also - Diabetes Mellitus:  Medication regimen adjusted for prep/procedure. PMD/consultation/hospital notes and Labs/imaging/prior endoscopic results reviewed to extent noted in HPI; and, if procedure code billed on this visit for lab draw, this serves to signify that labs were drawn here in this office.

## 2024-04-08 ENCOUNTER — APPOINTMENT (OUTPATIENT)
Dept: GASTROENTEROLOGY | Facility: HOSPITAL | Age: 84
End: 2024-04-08

## 2024-04-19 ENCOUNTER — RESULT REVIEW (OUTPATIENT)
Age: 84
End: 2024-04-19

## 2024-05-14 ENCOUNTER — APPOINTMENT (OUTPATIENT)
Dept: GERIATRICS | Facility: CLINIC | Age: 84
End: 2024-05-14
Payer: MEDICARE

## 2024-05-14 VITALS
HEART RATE: 60 BPM | TEMPERATURE: 98.6 F | DIASTOLIC BLOOD PRESSURE: 62 MMHG | WEIGHT: 193 LBS | BODY MASS INDEX: 33.13 KG/M2 | SYSTOLIC BLOOD PRESSURE: 142 MMHG | OXYGEN SATURATION: 98 %

## 2024-05-14 DIAGNOSIS — M19.022 PRIMARY OSTEOARTHRITIS, LEFT ELBOW: ICD-10-CM

## 2024-05-14 DIAGNOSIS — M77.12 LATERAL EPICONDYLITIS, LEFT ELBOW: ICD-10-CM

## 2024-05-14 DIAGNOSIS — G30.9 ALZHEIMER'S DISEASE, UNSPECIFIED: ICD-10-CM

## 2024-05-14 DIAGNOSIS — E78.00 PURE HYPERCHOLESTEROLEMIA, UNSPECIFIED: ICD-10-CM

## 2024-05-14 DIAGNOSIS — F02.80 ALZHEIMER'S DISEASE, UNSPECIFIED: ICD-10-CM

## 2024-05-14 DIAGNOSIS — I10 ESSENTIAL (PRIMARY) HYPERTENSION: ICD-10-CM

## 2024-05-14 DIAGNOSIS — Z23 ENCOUNTER FOR IMMUNIZATION: ICD-10-CM

## 2024-05-14 PROCEDURE — G0009: CPT

## 2024-05-14 PROCEDURE — 99214 OFFICE O/P EST MOD 30 MIN: CPT

## 2024-05-14 PROCEDURE — G2211 COMPLEX E/M VISIT ADD ON: CPT

## 2024-05-14 PROCEDURE — 90677 PCV20 VACCINE IM: CPT

## 2024-05-14 RX ORDER — ATORVASTATIN CALCIUM 20 MG/1
20 TABLET, FILM COATED ORAL
Qty: 90 | Refills: 3 | Status: ACTIVE | COMMUNITY

## 2024-05-14 RX ORDER — ALBUTEROL SULFATE 2.5 MG/3ML
(2.5 MG/3ML) SOLUTION RESPIRATORY (INHALATION)
Qty: 3 | Refills: 5 | Status: ACTIVE | COMMUNITY
Start: 2024-02-06 | End: 1900-01-01

## 2024-05-14 NOTE — ASSESSMENT
[FreeTextEntry1] : 5/14/24 mammo 4/24 dexa next week covid vaccine last week no more abd pain to go to ortho  prevnar 20 given left arm  cologuard pending

## 2024-05-14 NOTE — HISTORY OF PRESENT ILLNESS
[0] : 2) Feeling down, depressed, or hopeless: Not at all (0) [PHQ-2 Negative - No further assessment needed] : PHQ-2 Negative - No further assessment needed [FreeTextEntry1] : 83 year old saw Dr Mchugh - who is retiring Portugese   7 years  lived in [place] prior  recent diagnosis of of Alzheimers moved in with daughter 1 1/2 years ago  cardiologist - hypertension had est normal EF, no ischemia 4/5/23  endocrine - DM  has optho has cataracts  neuro- f/up headaches and dizziness relief with mg and vitamins B2 and coenzyme Q  had cortisone injection in Jan 24 left   labs12/19/23 cmet hba1c 5.6 lipids 185  taking fish ol - to recheck [facility]  has proxy  [facility] living will  had flu vaccine - 9/7/23 covid booster - 9/26/23 had RSV - 9/18/23 all in sept unsure   UA clear in OCt  feb - to see urologist bc of frequent urination   mammo - remote past [facility]  dexa - needs  never had colonsocpy  parents lived to 80s in [place] n osiblings  2 children son and daughter  5/14/24 was in [facility] with abd pain resolved in ER seen by Dr Tank COSTELLO takes psyllium giving her gas lipitor 20 mg instead of 10 mg  dr Meléndez 5/2 - has overactive bladder pelvic floor  therapy at [place] to start 6/24/24 -is endocrine - needs labs before  bp at home in 130s  tried vesicare - gave nightmares and stopped  tires easily  had covid booster 5/8/24

## 2024-05-14 NOTE — PHYSICAL EXAM
[Alert] : alert [Well Nourished] : well nourished [Healthy Appearing] : healthy appearing [Normal Voice/Communication] : normal voice/communication [Sclera] : the sclera and conjunctiva were normal [Normal Outer Ear/Nose] : the ears and nose were normal in appearance [No Neck Mass] : no neck mass was observed [Thyroid Not Enlarged] : the thyroid was not enlarged [Supple] : the neck was supple [No Thyroid Nodules] : there were no palpable thyroid nodules [No Respiratory Distress] : no respiratory distress [No Acc Muscle Use] : no accessory muscle use [Respiration, Rhythm And Depth] : normal respiratory rhythm and effort [Auscultation Breath Sounds / Voice Sounds] : lungs were clear to auscultation bilaterally [Normal S1, S2] : normal S1 and S2 [Murmurs] : no murmurs [Heart Rate And Rhythm] : heart rate was normal and rhythm regular [No Rubs] : no pericardial rub [Edema] : edema was not present [Normal Appearance] : normal in appearance [Breast Palpation Mass] : no palpable masses [No Nipple Discharge] : no nipple discharge [Abdomen Tenderness] : non-tender [No Masses] : no abdominal mass palpated [Abdomen Soft] : soft [Cervical Lymph Nodes Enlarged Posterior Bilaterally] : posterior cervical [Supraclavicular Lymph Nodes Enlarged Bilaterally] : supraclavicular [Cervical Lymph Nodes Enlarged Anterior Bilaterally] : anterior cervical, supraclavicular [Axillary Lymph Nodes Enlarged Bilaterally] : axillary [] : no rash [Skin Lesions] : no skin lesions [No Focal Deficits] : no focal deficits [Motor Exam] : the motor exam was normal [Normal] : normal affect and normal mood [de-identified] : has straight cane [de-identified] : seb keratoses [de-identified] : normal conversation

## 2024-05-30 ENCOUNTER — APPOINTMENT (OUTPATIENT)
Dept: GERIATRICS | Facility: CLINIC | Age: 84
End: 2024-05-30

## 2024-06-06 ENCOUNTER — APPOINTMENT (OUTPATIENT)
Dept: NEPHROLOGY | Facility: CLINIC | Age: 84
End: 2024-06-06

## 2024-06-07 ENCOUNTER — APPOINTMENT (OUTPATIENT)
Dept: VASCULAR SURGERY | Facility: CLINIC | Age: 84
End: 2024-06-07
Payer: MEDICARE

## 2024-06-07 VITALS
WEIGHT: 194 LBS | SYSTOLIC BLOOD PRESSURE: 110 MMHG | HEART RATE: 64 BPM | RESPIRATION RATE: 17 BRPM | OXYGEN SATURATION: 97 % | HEIGHT: 64 IN | BODY MASS INDEX: 33.12 KG/M2 | DIASTOLIC BLOOD PRESSURE: 60 MMHG

## 2024-06-07 DIAGNOSIS — I83.892 VARICOSE VEINS OF LEFT LOWER EXTREMITY WITH OTHER COMPLICATIONS: ICD-10-CM

## 2024-06-07 DIAGNOSIS — I87.2 VENOUS INSUFFICIENCY (CHRONIC) (PERIPHERAL): ICD-10-CM

## 2024-06-07 DIAGNOSIS — I83.891 VARICOSE VEINS OF RIGHT LOWER EXTREMITY WITH OTHER COMPLICATIONS: ICD-10-CM

## 2024-06-07 PROCEDURE — 99214 OFFICE O/P EST MOD 30 MIN: CPT

## 2024-06-07 NOTE — HISTORY OF PRESENT ILLNESS
[FreeTextEntry1] : 82 yo female presents to the office with a chief complaint of bluish discoloration of the feet. She also reports mild edema of bilateral lower extremities. She has a history of a venous procedure on the left. She denies pain in her legs with ambulation. She does not currently wear compression stockings. She denies a history of DVT or SVT.  [de-identified] : 85 yo female returns in follow up for a chief complaint of right lower extremity edema and pain associated with varicose veins. She has a history of venous insufficiency. She reports that her varicose veins have become increasingly painful since her last visit. She would like to discuss treatment options.

## 2024-06-07 NOTE — PHYSICAL EXAM
[JVD] : no jugular venous distention  [Normal Breath Sounds] : Normal breath sounds [Normal Rate and Rhythm] : normal rate and rhythm [Ankle Swelling (On Exam)] : present [Ankle Swelling Bilaterally] : bilaterally  [Ankle Swelling On The Right] : mild [Varicose Veins Of Lower Extremities] : present [Varicose Veins Of The Right Leg] : of the right leg [Ankle Swelling On The Left] : moderate [] : bilaterally [Alert] : alert [Oriented to Person] : oriented to person [Oriented to Place] : oriented to place [Oriented to Time] : oriented to time [de-identified] : Awake and Alert [de-identified] : varicose veins bilateral calves > 3mm, spider veins bl [de-identified] : Appropriate

## 2024-06-07 NOTE — ASSESSMENT
[FreeTextEntry1] : 84 year female with symptomatic varicose veins of the right lower extremity.  She has a history of venous insufficiency.  I reviewed her previous ultrasound which demonstrated a 9 mm greater saphenous vein.  I believe that she would be an excellent candidate for an endovenous ablation of her greater saphenous vein.  I discussed the risks and benefits of the procedure including infection bleeding and DVT with the patient and her daughter.  I recommended that she undergo repeat venous duplex to confirm the presence of venous insufficiency.  She will follow-up with me when the results are available to discuss how she would like to proceed.

## 2024-07-01 ENCOUNTER — APPOINTMENT (OUTPATIENT)
Dept: NEPHROLOGY | Facility: CLINIC | Age: 84
End: 2024-07-01

## 2024-07-08 ENCOUNTER — RESULT REVIEW (OUTPATIENT)
Age: 84
End: 2024-07-08

## 2024-07-16 ENCOUNTER — APPOINTMENT (OUTPATIENT)
Dept: GERIATRICS | Facility: CLINIC | Age: 84
End: 2024-07-16
Payer: MEDICARE

## 2024-07-16 VITALS
TEMPERATURE: 99 F | HEART RATE: 55 BPM | WEIGHT: 189 LBS | OXYGEN SATURATION: 97 % | HEIGHT: 64 IN | SYSTOLIC BLOOD PRESSURE: 128 MMHG | BODY MASS INDEX: 32.27 KG/M2 | DIASTOLIC BLOOD PRESSURE: 80 MMHG

## 2024-07-16 DIAGNOSIS — M19.011 PRIMARY OSTEOARTHRITIS, RIGHT SHOULDER: ICD-10-CM

## 2024-07-16 DIAGNOSIS — E78.00 PURE HYPERCHOLESTEROLEMIA, UNSPECIFIED: ICD-10-CM

## 2024-07-16 DIAGNOSIS — F02.80 ALZHEIMER'S DISEASE, UNSPECIFIED: ICD-10-CM

## 2024-07-16 DIAGNOSIS — M19.022 PRIMARY OSTEOARTHRITIS, LEFT ELBOW: ICD-10-CM

## 2024-07-16 DIAGNOSIS — G30.9 ALZHEIMER'S DISEASE, UNSPECIFIED: ICD-10-CM

## 2024-07-16 DIAGNOSIS — M81.0 AGE-RELATED OSTEOPOROSIS W/OUT CURRENT PATHOLOGICAL FRACTURE: ICD-10-CM

## 2024-07-16 DIAGNOSIS — F41.9 ANXIETY DISORDER, UNSPECIFIED: ICD-10-CM

## 2024-07-16 DIAGNOSIS — R10.9 UNSPECIFIED ABDOMINAL PAIN: ICD-10-CM

## 2024-07-16 DIAGNOSIS — I87.2 VENOUS INSUFFICIENCY (CHRONIC) (PERIPHERAL): ICD-10-CM

## 2024-07-16 PROCEDURE — 99214 OFFICE O/P EST MOD 30 MIN: CPT

## 2024-07-16 RX ORDER — OMEPRAZOLE 20 MG/1
20 CAPSULE, DELAYED RELEASE ORAL
Qty: 90 | Refills: 3 | Status: ACTIVE | COMMUNITY
Start: 2024-07-16 | End: 1900-01-01

## 2024-07-16 RX ORDER — LORAZEPAM 0.5 MG/1
0.5 TABLET ORAL TWICE DAILY
Qty: 60 | Refills: 0 | Status: ACTIVE | COMMUNITY
Start: 2024-07-16 | End: 1900-01-01

## 2024-07-16 RX ORDER — IBANDRONATE SODIUM 150 MG/1
150 TABLET ORAL
Qty: 3 | Refills: 3 | Status: ACTIVE | COMMUNITY
Start: 2024-07-16 | End: 1900-01-01

## 2024-08-14 ENCOUNTER — APPOINTMENT (OUTPATIENT)
Dept: GERIATRICS | Facility: HOME HEALTH | Age: 84
End: 2024-08-14

## 2024-09-10 ENCOUNTER — APPOINTMENT (OUTPATIENT)
Dept: GERIATRICS | Facility: CLINIC | Age: 84
End: 2024-09-10
Payer: MEDICARE

## 2024-09-10 VITALS
HEIGHT: 64 IN | TEMPERATURE: 97.3 F | SYSTOLIC BLOOD PRESSURE: 120 MMHG | DIASTOLIC BLOOD PRESSURE: 70 MMHG | HEART RATE: 58 BPM | BODY MASS INDEX: 31.92 KG/M2 | OXYGEN SATURATION: 99 % | WEIGHT: 187 LBS

## 2024-09-10 DIAGNOSIS — F02.80 ALZHEIMER'S DISEASE, UNSPECIFIED: ICD-10-CM

## 2024-09-10 DIAGNOSIS — I87.2 VENOUS INSUFFICIENCY (CHRONIC) (PERIPHERAL): ICD-10-CM

## 2024-09-10 DIAGNOSIS — Z23 ENCOUNTER FOR IMMUNIZATION: ICD-10-CM

## 2024-09-10 DIAGNOSIS — E11.9 TYPE 2 DIABETES MELLITUS W/OUT COMPLICATIONS: ICD-10-CM

## 2024-09-10 DIAGNOSIS — I10 ESSENTIAL (PRIMARY) HYPERTENSION: ICD-10-CM

## 2024-09-10 DIAGNOSIS — E78.00 PURE HYPERCHOLESTEROLEMIA, UNSPECIFIED: ICD-10-CM

## 2024-09-10 DIAGNOSIS — F41.9 ANXIETY DISORDER, UNSPECIFIED: ICD-10-CM

## 2024-09-10 DIAGNOSIS — G30.9 ALZHEIMER'S DISEASE, UNSPECIFIED: ICD-10-CM

## 2024-09-10 PROCEDURE — G0008: CPT

## 2024-09-10 PROCEDURE — 90662 IIV NO PRSV INCREASED AG IM: CPT

## 2024-09-10 PROCEDURE — 99214 OFFICE O/P EST MOD 30 MIN: CPT

## 2024-09-10 RX ORDER — AMLODIPINE BESYLATE 2.5 MG/1
2.5 TABLET ORAL DAILY
Qty: 90 | Refills: 3 | Status: ACTIVE | COMMUNITY
Start: 2024-09-10

## 2024-09-10 NOTE — PHYSICAL EXAM
[Alert] : alert [Well Nourished] : well nourished [Healthy Appearing] : healthy appearing [Normal Voice/Communication] : normal voice/communication [Sclera] : the sclera and conjunctiva were normal [Normal Outer Ear/Nose] : the ears and nose were normal in appearance [No Neck Mass] : no neck mass was observed [Thyroid Not Enlarged] : the thyroid was not enlarged [Supple] : the neck was supple [No Thyroid Nodules] : there were no palpable thyroid nodules [No Respiratory Distress] : no respiratory distress [No Acc Muscle Use] : no accessory muscle use [Respiration, Rhythm And Depth] : normal respiratory rhythm and effort [Auscultation Breath Sounds / Voice Sounds] : lungs were clear to auscultation bilaterally [Normal S1, S2] : normal S1 and S2 [Murmurs] : no murmurs [Heart Rate And Rhythm] : heart rate was normal and rhythm regular [No Rubs] : no pericardial rub [Edema] : edema was not present [Normal Appearance] : normal in appearance [No Masses] : no abdominal mass palpated [Abdomen Soft] : soft [Cervical Lymph Nodes Enlarged Posterior Bilaterally] : posterior cervical [Supraclavicular Lymph Nodes Enlarged Bilaterally] : supraclavicular [Cervical Lymph Nodes Enlarged Anterior Bilaterally] : anterior cervical, supraclavicular [Axillary Lymph Nodes Enlarged Bilaterally] : axillary [Normal] : normal skin color and pigmentation [] : no rash [Skin Lesions] : no skin lesions [No Focal Deficits] : no focal deficits [Motor Exam] : the motor exam was normal [de-identified] : came in wheel chair [de-identified] : seb keratoses [de-identified] : agitated here when discussing aide - wants to go to [place] to be with son [de-identified] : agitated at times

## 2024-09-10 NOTE — ASSESSMENT
[FreeTextEntry1] : 5/14/24 mammo 4/24 dexa next week covid vaccine last week no more abd pain to go to ortho  prevnar 20 given left arm  cologuard pending  7/16/24  trial of omeprazole for abd   pain  boniva for OP  cerumen removed   has just started lexapro 5 mg will suggest continue add some ativan  9/10/24 better now good spirits off lexapro sees optho [facility] - last a1c 6.1 had recent labs wnl - will scan in flu vaccine given today

## 2024-09-10 NOTE — HISTORY OF PRESENT ILLNESS
[FreeTextEntry1] : 83 year old saw Dr Mchugh - who is retiring Portugese   7 years  lived in [place] prior  recent diagnosis of of Alzheimers moved in with daughter 1 1/2 years ago  cardiologist - hypertension had est normal EF, no ischemia 4/5/23  endocrine - DM  has optho has cataracts  neuro- f/up headaches and dizziness relief with mg and vitamins B2 and coenzyme Q  had cortisone injection in Jan 24 left   labs12/19/23 cmet hba1c 5.6 lipids 185  taking fish ol - to recheck [facility]  has proxy  [facility] living will  had flu vaccine - 9/7/23 covid booster - 9/26/23 had RSV - 9/18/23 all in sept unsure   UA clear in OCt  feb - to see urologist bc of frequent urination   mammo - remote past [facility]  dexa - needs  never had colonsocpy  parents lived to 80s in [facility] n osiblings  2 children son and daughter  5/14/24 was in [facility] with abd pain resolved in ER seen by Dr Tank COSTELLO takes psyllium giving her gas lipitor 20 mg instead of 10 mg  dr Meléndez 5/2 - has overactive bladder pelvic floor  therapy at [facility] to start 6/24/24 -is endocrine - needs labs before  bp at home in 130s  tried vesicare - gave nightmares and stopped  tires easily  had covid booster 5/8/24 7/16/24 yesterday had some abd pain - went to urgent care was    feeling better and they did nothing urgent care in [place]      [place]  eating well no vomiting nor diarrhea  today abd pain, nausea, left arm and left leg hurt  has been agitated health care aide just left (had same one 1 1/2 years) left does not like new aide - is crying daughter said woman is fine and cooking her nice food  has lexapro 5 mg from neurologist since july1 if anything is worse  gas ex some help  9/10/24 pt is feeling better needs flu vaccine comes with labs june 19, 24 - repeated       on oct 14, 24 sees cardiologist

## 2024-09-10 NOTE — REVIEW OF SYSTEMS
[Abdominal Pain] : no abdominal pain [Arthralgias] : arthralgias [Confused] : confusion [Negative] : Heme/Lymph [FreeTextEntry9] : left arm chronically hurts [de-identified] : better

## 2024-09-11 PROBLEM — Z23 ENCOUNTER FOR IMMUNIZATION: Status: ACTIVE | Noted: 2024-05-14 | Resolved: 2024-09-25

## 2024-09-20 ENCOUNTER — APPOINTMENT (OUTPATIENT)
Dept: HEART AND VASCULAR | Facility: CLINIC | Age: 84
End: 2024-09-20

## 2024-09-20 ENCOUNTER — APPOINTMENT (OUTPATIENT)
Dept: VASCULAR SURGERY | Facility: CLINIC | Age: 84
End: 2024-09-20

## 2024-09-24 ENCOUNTER — APPOINTMENT (OUTPATIENT)
Dept: GERIATRICS | Facility: CLINIC | Age: 84
End: 2024-09-24
Payer: MEDICARE

## 2024-09-24 VITALS
DIASTOLIC BLOOD PRESSURE: 78 MMHG | TEMPERATURE: 98.5 F | HEART RATE: 58 BPM | BODY MASS INDEX: 32.27 KG/M2 | WEIGHT: 189 LBS | SYSTOLIC BLOOD PRESSURE: 130 MMHG | OXYGEN SATURATION: 98 % | HEIGHT: 64 IN

## 2024-09-24 DIAGNOSIS — G30.9 ALZHEIMER'S DISEASE, UNSPECIFIED: ICD-10-CM

## 2024-09-24 DIAGNOSIS — R19.7 DIARRHEA, UNSPECIFIED: ICD-10-CM

## 2024-09-24 DIAGNOSIS — K57.30 DIVERTICULOSIS OF LARGE INTESTINE W/OUT PERFORATION OR ABSCESS W/OUT BLEEDING: ICD-10-CM

## 2024-09-24 DIAGNOSIS — F02.80 ALZHEIMER'S DISEASE, UNSPECIFIED: ICD-10-CM

## 2024-09-24 PROCEDURE — 99214 OFFICE O/P EST MOD 30 MIN: CPT

## 2024-09-24 NOTE — PHYSICAL EXAM
[Alert] : alert [Well Nourished] : well nourished [Healthy Appearing] : healthy appearing [Normal Voice/Communication] : normal voice/communication [Sclera] : the sclera and conjunctiva were normal [Normal Outer Ear/Nose] : the ears and nose were normal in appearance [No Neck Mass] : no neck mass was observed [Thyroid Not Enlarged] : the thyroid was not enlarged [Supple] : the neck was supple [No Thyroid Nodules] : there were no palpable thyroid nodules [No Respiratory Distress] : no respiratory distress [No Acc Muscle Use] : no accessory muscle use [Respiration, Rhythm And Depth] : normal respiratory rhythm and effort [Auscultation Breath Sounds / Voice Sounds] : lungs were clear to auscultation bilaterally [Normal S1, S2] : normal S1 and S2 [Murmurs] : no murmurs [Heart Rate And Rhythm] : heart rate was normal and rhythm regular [No Rubs] : no pericardial rub [Edema] : edema was not present [Normal Appearance] : normal in appearance [Bowel Sounds] : normal bowel sounds [No Masses] : no abdominal mass palpated [Abdomen Soft] : soft [Cervical Lymph Nodes Enlarged Posterior Bilaterally] : posterior cervical [Supraclavicular Lymph Nodes Enlarged Bilaterally] : supraclavicular [Cervical Lymph Nodes Enlarged Anterior Bilaterally] : anterior cervical, supraclavicular [Axillary Lymph Nodes Enlarged Bilaterally] : axillary [Normal] : normal skin color and pigmentation [] : no rash [Skin Lesions] : no skin lesions [No Focal Deficits] : no focal deficits [Motor Exam] : the motor exam was normal [de-identified] : came in wheel chair [de-identified] : seb keratoses [de-identified] : agitated here when discussing aide - wants to go to [place] to be with son [de-identified] : agitated at times

## 2024-09-24 NOTE — ASSESSMENT
[FreeTextEntry1] : 5/14/24 mammo 4/24 dexa next week covid vaccine last week no more abd pain to go to ortho  prevnar 20 given left arm  cologuard pending  7/16/24  trial of omeprazole for abd   pain  boniva for OP  cerumen removed   has just started lexapro 5 mg will suggest continue add some ativan  9/10/24 better now good spirits off lexapro sees optho [facility] - last a1c 6.1 had recent labs wnl - will scan in flu vaccine given today  9/24/24 pt was in ER with abd pain last week had labs and CT abd no cause found she feels better except had diarrhea after lunch  times one no abd pain no n/v  reports all reviwed  suggest cut   fish oil in half try pepto or immodium if recurs no infection seen No

## 2024-09-24 NOTE — HISTORY OF PRESENT ILLNESS
[FreeTextEntry1] : 83 year old saw Dr Mchugh - who is retiring Portugese   7 years  lived in [place] prior  recent diagnosis of of Alzheimers moved in with daughter 1 1/2 years ago  cardiologist - hypertension had est normal EF, no ischemia 4/5/23  endocrine - DM  has optho has cataracts  neuro- f/up headaches and dizziness relief with mg and vitamins B2 and coenzyme Q  had cortisone injection in Jan 24 left   labs12/19/23 cmet hba1c 5.6 lipids 185  taking fish ol - to recheck [facility]  has proxy  [facility] living will  had flu vaccine - 9/7/23 covid booster - 9/26/23 had RSV - 9/18/23 all in sept unsure   UA clear in OCt  feb - to see urologist bc of frequent urination   mammo - remote past [facility]  dexa - needs  never had colonsocpy  parents lived to 80s in [facility] n osiblings  2 children son and daughter  5/14/24 was in [facility] with abd pain resolved in ER seen by Dr Tank COSETLLO takes psyllium giving her gas lipitor 20 mg instead of 10 mg  dr Meléndez 5/2 - has overactive bladder pelvic floor  therapy at [place] to start 6/24/24 -is endocrine - needs labs before  bp at home in 130s  tried vesicare - gave nightmares and stopped  tires easily  had covid booster 5/8/24 7/16/24 yesterday had some abd pain - went to urgent care was    feeling better and they did nothing urgent care in [place]      [place]  eating well no vomiting nor diarrhea  today abd pain, nausea, left arm and left leg hurt  has been agitated health care aide just left (had same one 1 1/2 years) left does not like new aide - is crying daughter said woman is fine and cooking her nice food  has lexapro 5 mg from neurologist since july1 if anything is worse  gas ex some help  9/10/24 pt is feeling better needs flu vaccine comes with labs june 19, 24 - repeated        sees cardiologist   9/24/24 was  in er on sept 17 - [facility] with abd pain nausea diarrhea ct scan - diverticulosis not itis had labs on and off diarrhea not yesterday - today yes no new meds

## 2024-09-24 NOTE — REVIEW OF SYSTEMS
[Diarrhea] : diarrhea [Anxiety] : anxiety [Negative] : Heme/Lymph [FreeTextEntry7] : once today after lunch (salmon and vegtables)

## 2024-09-30 ENCOUNTER — APPOINTMENT (OUTPATIENT)
Dept: PEDIATRIC ORTHOPEDIC SURGERY | Facility: CLINIC | Age: 84
End: 2024-09-30
Payer: MEDICARE

## 2024-09-30 VITALS — BODY MASS INDEX: 29.99 KG/M2 | WEIGHT: 180 LBS | HEIGHT: 65 IN | TEMPERATURE: 96.8 F

## 2024-09-30 DIAGNOSIS — S46.012A STRAIN OF MUSCLE(S) AND TENDON(S) OF THE ROTATOR CUFF OF LEFT SHOULDER, INITIAL ENCOUNTER: ICD-10-CM

## 2024-09-30 PROCEDURE — 20610 DRAIN/INJ JOINT/BURSA W/O US: CPT | Mod: LT

## 2024-09-30 PROCEDURE — 99212 OFFICE O/P EST SF 10 MIN: CPT | Mod: 25

## 2024-09-30 NOTE — PHYSICAL EXAM
[de-identified] : Her exam today reveals she has obvious impingement with restricted painful full flexion abduction in scapular plane and restricted rotation her strength is acceptable.

## 2024-09-30 NOTE — ASSESSMENT
[FreeTextEntry1] : Impression: Strain left shoulder  I have injected the subacromial space uneventfully she will continue conservatively

## 2024-12-10 ENCOUNTER — APPOINTMENT (OUTPATIENT)
Dept: GERIATRICS | Facility: CLINIC | Age: 84
End: 2024-12-10
Payer: MEDICARE

## 2024-12-10 VITALS — SYSTOLIC BLOOD PRESSURE: 115 MMHG | DIASTOLIC BLOOD PRESSURE: 60 MMHG

## 2024-12-10 VITALS
BODY MASS INDEX: 31.99 KG/M2 | OXYGEN SATURATION: 100 % | HEIGHT: 65 IN | TEMPERATURE: 98.6 F | SYSTOLIC BLOOD PRESSURE: 144 MMHG | WEIGHT: 192 LBS | HEART RATE: 60 BPM | DIASTOLIC BLOOD PRESSURE: 68 MMHG

## 2024-12-10 DIAGNOSIS — M19.011 PRIMARY OSTEOARTHRITIS, RIGHT SHOULDER: ICD-10-CM

## 2024-12-10 DIAGNOSIS — B36.9 SUPERFICIAL MYCOSIS, UNSPECIFIED: ICD-10-CM

## 2024-12-10 DIAGNOSIS — G30.9 ALZHEIMER'S DISEASE, UNSPECIFIED: ICD-10-CM

## 2024-12-10 DIAGNOSIS — F02.80 ALZHEIMER'S DISEASE, UNSPECIFIED: ICD-10-CM

## 2024-12-10 DIAGNOSIS — I10 ESSENTIAL (PRIMARY) HYPERTENSION: ICD-10-CM

## 2024-12-10 DIAGNOSIS — E11.9 TYPE 2 DIABETES MELLITUS W/OUT COMPLICATIONS: ICD-10-CM

## 2024-12-10 PROCEDURE — G2211 COMPLEX E/M VISIT ADD ON: CPT

## 2024-12-10 PROCEDURE — 99214 OFFICE O/P EST MOD 30 MIN: CPT

## 2024-12-10 RX ORDER — CLOTRIMAZOLE AND BETAMETHASONE DIPROPIONATE 10; .5 MG/G; MG/G
1-0.05 CREAM TOPICAL TWICE DAILY
Qty: 1 | Refills: 3 | Status: ACTIVE | COMMUNITY
Start: 2024-12-10 | End: 1900-01-01

## 2025-03-18 ENCOUNTER — APPOINTMENT (OUTPATIENT)
Dept: GERIATRICS | Facility: CLINIC | Age: 85
End: 2025-03-18
Payer: MEDICARE

## 2025-03-18 VITALS
HEART RATE: 62 BPM | OXYGEN SATURATION: 100 % | DIASTOLIC BLOOD PRESSURE: 76 MMHG | WEIGHT: 192 LBS | BODY MASS INDEX: 31.99 KG/M2 | SYSTOLIC BLOOD PRESSURE: 124 MMHG | HEIGHT: 65 IN | TEMPERATURE: 98 F

## 2025-03-18 DIAGNOSIS — E11.9 TYPE 2 DIABETES MELLITUS W/OUT COMPLICATIONS: ICD-10-CM

## 2025-03-18 DIAGNOSIS — F02.80 ALZHEIMER'S DISEASE, UNSPECIFIED: ICD-10-CM

## 2025-03-18 DIAGNOSIS — I87.2 VENOUS INSUFFICIENCY (CHRONIC) (PERIPHERAL): ICD-10-CM

## 2025-03-18 DIAGNOSIS — G30.9 ALZHEIMER'S DISEASE, UNSPECIFIED: ICD-10-CM

## 2025-03-18 DIAGNOSIS — Z86.79 PERSONAL HISTORY OF OTHER DISEASES OF THE CIRCULATORY SYSTEM: ICD-10-CM

## 2025-03-18 DIAGNOSIS — E78.00 PURE HYPERCHOLESTEROLEMIA, UNSPECIFIED: ICD-10-CM

## 2025-03-18 PROCEDURE — 99213 OFFICE O/P EST LOW 20 MIN: CPT

## 2025-03-18 PROCEDURE — G2211 COMPLEX E/M VISIT ADD ON: CPT

## 2025-05-23 RX ORDER — BREXPIPRAZOLE 0.25 MG/1
0.25 TABLET ORAL TWICE DAILY
Qty: 60 | Refills: 5 | Status: ACTIVE | COMMUNITY
Start: 2025-05-23 | End: 1900-01-01

## 2025-07-15 ENCOUNTER — NON-APPOINTMENT (OUTPATIENT)
Age: 85
End: 2025-07-15

## 2025-07-15 ENCOUNTER — APPOINTMENT (OUTPATIENT)
Dept: GERIATRICS | Facility: CLINIC | Age: 85
End: 2025-07-15
Payer: MEDICARE

## 2025-07-15 VITALS
BODY MASS INDEX: 31.16 KG/M2 | WEIGHT: 187 LBS | SYSTOLIC BLOOD PRESSURE: 126 MMHG | HEIGHT: 65 IN | HEART RATE: 59 BPM | TEMPERATURE: 98.9 F | OXYGEN SATURATION: 98 % | DIASTOLIC BLOOD PRESSURE: 78 MMHG

## 2025-07-15 PROCEDURE — 99214 OFFICE O/P EST MOD 30 MIN: CPT
